# Patient Record
Sex: FEMALE | Race: BLACK OR AFRICAN AMERICAN | NOT HISPANIC OR LATINO | Employment: OTHER | URBAN - METROPOLITAN AREA
[De-identification: names, ages, dates, MRNs, and addresses within clinical notes are randomized per-mention and may not be internally consistent; named-entity substitution may affect disease eponyms.]

---

## 2017-01-12 ENCOUNTER — OFFICE VISIT (OUTPATIENT)
Dept: OBSTETRICS AND GYNECOLOGY | Facility: CLINIC | Age: 27
End: 2017-01-12
Payer: MEDICAID

## 2017-01-12 VITALS
BODY MASS INDEX: 25.57 KG/M2 | WEIGHT: 162.94 LBS | HEIGHT: 67 IN | DIASTOLIC BLOOD PRESSURE: 74 MMHG | SYSTOLIC BLOOD PRESSURE: 110 MMHG

## 2017-01-12 DIAGNOSIS — Z01.419 ENCOUNTER FOR GYNECOLOGICAL EXAMINATION WITHOUT ABNORMAL FINDING: Primary | ICD-10-CM

## 2017-01-12 DIAGNOSIS — L30.9 DERMATITIS: ICD-10-CM

## 2017-01-12 PROCEDURE — 99213 OFFICE O/P EST LOW 20 MIN: CPT | Mod: S$PBB,,, | Performed by: OBSTETRICS & GYNECOLOGY

## 2017-01-12 PROCEDURE — 87480 CANDIDA DNA DIR PROBE: CPT

## 2017-01-12 PROCEDURE — 99999 PR PBB SHADOW E&M-EST. PATIENT-LVL II: CPT | Mod: PBBFAC,,, | Performed by: OBSTETRICS & GYNECOLOGY

## 2017-01-12 PROCEDURE — 99212 OFFICE O/P EST SF 10 MIN: CPT | Mod: PBBFAC | Performed by: OBSTETRICS & GYNECOLOGY

## 2017-01-12 RX ORDER — ECONAZOLE NITRATE 10 MG/G
CREAM TOPICAL DAILY
Qty: 30 G | Refills: 1 | Status: SHIPPED | OUTPATIENT
Start: 2017-01-12

## 2017-01-12 NOTE — PROGRESS NOTES
Subjective:      Chief Complaint:    Chief Complaint   Patient presents with    Vaginal Discharge       Menstrual History:    OB History      Para Term  AB TAB SAB Ectopic Multiple Living    1 1 1       1          Menarche age: 15     Patient's last menstrual period was 2016.           Objective:        History of Present Illness AND  Examination detailed DICTATE:       PRESENT ILLNESS/PHYSICAL EXAMINATION NOTE:  The patient is 26 years of age.  New   patient to me, old patient to the clinic.  The patient had examination complete   yearly exam by nurse practitioner.  Pap smear is negative, found to be normal.    The patient presented to clinic because of irritation in the iliofemoral crease   and some discharge.  She is not on any birth control medication, uses condoms.    Menarche at age 15, regular cycles 7 days bleeding first 2-3 days heavy,   moderate pain, not requiring treatment.    REVIEW OF SYSTEMS:  Essentially negative.    PHYSICAL EXAMINATION:  VITAL SIGNS:  Blood pressure 110/74, weight, is 162.  GENERAL:  Well-nourished, well-developed female.  Examination essentially   normal.  PELVIC:  External is normal.  Bartholin, urethral and Summer Set as negative.  Vagina   is clear.  Mild discharge.  It looks physiologic.  No evidence of inflammation.    Cervix clear.  Uterus, normal size.  Adnexa negative.  Good pelvic support.    PLAN:  However, the patient had irritation in the iliofemoral crease.  It   appears to be a contact type of problem.  We will gave her some Spectazole   ointment to be used twice a day for the next two weeks.  If the problem   disappears, we will not have to see her until her regular exam due.  If the   vaginal culture indicates any abnormality, then we will treat it according to   the abnormality.      SHEYLA  dd: 2017 14:29:32 (CST)  td: 2017 12:16:27 (CST)  Doc ID   #[id number]  Job ID #[id number]    CC:           Assessment:      Diagnosis: DERMATITIS        Plan:      Return in 10  months

## 2017-01-12 NOTE — MR AVS SNAPSHOT
[facility] [place] - [facility]/ [facility]YN  [address], [address]  [place] [address]  Phone: [phone]                  Kala Giraldo   2017 2:30 PM   Office Visit    Description:  Female : 1990   Provider:  Dominguez Sun MD   Department:  [facility] [place] - [facility]/ [facility]YN           Reason for Visit     Vaginal Discharge           Diagnoses this Visit        Comments    Encounter for gynecological examination without abnormal finding    -  Primary     Dermatitis                To Do List           Goals (5 Years of Data)     None      Follow-Up and Disposition     Return in about 10 months (around 2017).       These Medications        Disp Refills Start End    econazole nitrate 1 % cream 30 g 1 2017     Apply topically once daily. - Topical (Top)    Pharmacy: [facility] [address] [place] [address] [address] AT [facility]  #: [phone]         [facility] On Call     [facility] On Call Nurse Care Line -  Assistance  Registered nurses in the [facility] On Call Center provide clinical advisement, health education, appointment booking, and other advisory services.  Call for this free service at [phone].             Medications           Message regarding Medications     Verify the changes and/or additions to your medication regime listed below are the same as discussed with your clinician today.  If any of these changes or additions are incorrect, please notify your healthcare provider.        START taking these NEW medications        Refills    econazole nitrate 1 % cream 1    Sig: Apply topically once daily.    Class: Normal    Route: Topical (Top)           Verify that the below list of medications is an accurate representation of the medications you are currently taking.  If none reported, the list may be blank. If incorrect, please contact your healthcare provider. Carry this list with you in case of emergency.           Current Medications     econazole  "nitrate 1 % cream Apply topically once daily.           Clinical Reference Information           Vital Signs - Last Recorded  Most recent update: 1/12/2017  2:19 PM by Sarai Castro MA    BP Ht Wt LMP BMI    110/74 5' 7" (1.702 m) 73.9 kg (162 lb 14.7 oz) 12/25/2016 25.52 kg/m2      Blood Pressure          Most Recent Value    BP  110/74      Allergies as of 1/12/2017     No Known Allergies      Immunizations Administered on Date of Encounter - 1/12/2017     None      Orders Placed During Today's Visit      Normal Orders This Visit    Vaginosis Screen by DNA Probe       "

## 2017-01-13 LAB
CANDIDA RRNA VAG QL PROBE: NEGATIVE
G VAGINALIS RRNA GENITAL QL PROBE: POSITIVE
T VAGINALIS RRNA GENITAL QL PROBE: NEGATIVE

## 2017-01-16 DIAGNOSIS — N76.1 CHRONIC VAGINITIS: Primary | ICD-10-CM

## 2017-01-16 RX ORDER — METRONIDAZOLE 500 MG/1
500 TABLET ORAL 3 TIMES DAILY
Qty: 30 TABLET | Refills: 1 | Status: SHIPPED | OUTPATIENT
Start: 2017-01-16 | End: 2017-01-26

## 2017-02-13 ENCOUNTER — PATIENT MESSAGE (OUTPATIENT)
Dept: OBSTETRICS AND GYNECOLOGY | Facility: CLINIC | Age: 27
End: 2017-02-13

## 2017-02-13 DIAGNOSIS — N76.0 ACUTE VAGINITIS: Primary | ICD-10-CM

## 2017-02-13 RX ORDER — METRONIDAZOLE 500 MG/1
500 TABLET ORAL 3 TIMES DAILY
Qty: 30 TABLET | Refills: 1 | Status: SHIPPED | OUTPATIENT
Start: 2017-02-13 | End: 2017-02-23

## 2017-03-31 ENCOUNTER — HOSPITAL ENCOUNTER (EMERGENCY)
Facility: OTHER | Age: 27
Discharge: HOME | End: 2017-03-31
Attending: EMERGENCY MEDICINE
Payer: COMMERCIAL

## 2017-03-31 VITALS
OXYGEN SATURATION: 98 % | RESPIRATION RATE: 18 BRPM | HEIGHT: 67 IN | SYSTOLIC BLOOD PRESSURE: 112 MMHG | TEMPERATURE: 98 F | DIASTOLIC BLOOD PRESSURE: 78 MMHG | WEIGHT: 160 LBS | HEART RATE: 85 BPM | BODY MASS INDEX: 25.11 KG/M2

## 2017-03-31 DIAGNOSIS — J10.1 INFLUENZA B: Primary | ICD-10-CM

## 2017-03-31 LAB
B-HCG UR QL: NEGATIVE
CTP QC/QA: YES
FLUAV AG SPEC QL IA: NEGATIVE
FLUBV AG SPEC QL IA: POSITIVE
SPECIMEN SOURCE: ABNORMAL

## 2017-03-31 PROCEDURE — 96372 THER/PROPH/DIAG INJ SC/IM: CPT

## 2017-03-31 PROCEDURE — 81025 URINE PREGNANCY TEST: CPT | Performed by: EMERGENCY MEDICINE

## 2017-03-31 PROCEDURE — 87400 INFLUENZA A/B EACH AG IA: CPT

## 2017-03-31 PROCEDURE — 99283 EMERGENCY DEPT VISIT LOW MDM: CPT | Mod: 25

## 2017-03-31 PROCEDURE — 63600175 PHARM REV CODE 636 W HCPCS: Performed by: PHYSICIAN ASSISTANT

## 2017-03-31 RX ORDER — IBUPROFEN 800 MG/1
800 TABLET ORAL EVERY 6 HOURS PRN
Qty: 20 TABLET | Refills: 0 | Status: SHIPPED | OUTPATIENT
Start: 2017-03-31 | End: 2024-02-01

## 2017-03-31 RX ORDER — KETOROLAC TROMETHAMINE 30 MG/ML
30 INJECTION, SOLUTION INTRAMUSCULAR; INTRAVENOUS
Status: COMPLETED | OUTPATIENT
Start: 2017-03-31 | End: 2017-03-31

## 2017-03-31 RX ORDER — GUAIFENESIN 600 MG/1
1200 TABLET, EXTENDED RELEASE ORAL 2 TIMES DAILY
Qty: 40 TABLET | Refills: 0 | Status: SHIPPED | OUTPATIENT
Start: 2017-03-31 | End: 2017-04-10

## 2017-03-31 RX ADMIN — KETOROLAC TROMETHAMINE 30 MG: 30 INJECTION, SOLUTION INTRAMUSCULAR at 01:03

## 2017-03-31 NOTE — ED TRIAGE NOTES
Pt reports to ED c/o generalized body aches, chills, nasal congestion x 2 days, denies fevers, N/V/D, pt did not receive flu shot. AAO x 3.

## 2017-03-31 NOTE — ED PROVIDER NOTES
"Encounter Date: 3/31/2017       History     Chief Complaint   Patient presents with    Generalized Body Aches     x several days. + productive cough w/ clear sputum, fatigue, diarrhea " My son was recently told he had the Flu last week".      Review of patient's allergies indicates:  No Known Allergies  HPI Comments: Patient is a 26 y.o. female presenting to the emergency department with complaints of generalized body aches, nasal congestion, rhinorrhea and cough.  The patient reports her symptoms have been persistent for the past 2 days.  She reports her cough is nonproductive in nature, denies shortness of breath.  She also reports she has had chills at home, denies fever denies taking her temperature.  She states she has tried Robitussin and Flonase with no relief of her symptoms.  She denies abdominal pain, nausea or vomiting.  She does report that her son was recently diagnosed with the flu last week.  She denies dysuria hematuria.  She has not taken any medication for symptoms today.    The history is provided by the patient.     History reviewed. No pertinent past medical history.  History reviewed. No pertinent surgical history.  Family History   Problem Relation Age of Onset    Miscarriages / Stillbirths Maternal Grandfather      Social History   Substance Use Topics    Smoking status: Never Smoker    Smokeless tobacco: Never Used    Alcohol use Yes     Review of Systems   Constitutional: Negative for activity change, appetite change, chills, fatigue and fever.   HENT: Positive for congestion, rhinorrhea and sore throat. Negative for sinus pressure, sneezing and trouble swallowing.    Eyes: Negative for photophobia and visual disturbance.   Respiratory: Negative for cough, chest tightness, shortness of breath and wheezing.    Cardiovascular: Negative for chest pain and palpitations.   Gastrointestinal: Negative for abdominal pain, constipation, diarrhea, nausea and vomiting.   Genitourinary: Negative " for dysuria, hematuria and urgency.   Musculoskeletal: Positive for myalgias. Negative for back pain, neck pain and neck stiffness.   Skin: Negative for color change and wound.   Neurological: Positive for headaches. Negative for dizziness, weakness, light-headedness and numbness.   Psychiatric/Behavioral: Negative for agitation and confusion.       Physical Exam   Initial Vitals   BP Pulse Resp Temp SpO2   03/31/17 1159 03/31/17 1159 03/31/17 1159 03/31/17 1159 03/31/17 1159   111/59 96 16 97.8 °F (36.6 °C) 100 %     Physical Exam    Nursing note and vitals reviewed.  Constitutional: She appears well-developed and well-nourished. She is not diaphoretic. She is cooperative.  Non-toxic appearance. She does not have a sickly appearance. She does not appear ill. No distress.   Well appearing, -American female accompanied by her mother in the emergency department.  She is speaking in clear and full sentences, moving all extremities.  She is in no acute distress.   HENT:   Head: Normocephalic and atraumatic.   Right Ear: Hearing, tympanic membrane, external ear and ear canal normal.   Left Ear: Hearing, tympanic membrane, external ear and ear canal normal.   Nose: Mucosal edema and rhinorrhea present.   Mouth/Throat: Uvula is midline, oropharynx is clear and moist and mucous membranes are normal.   Eyes: Conjunctivae and EOM are normal.   Neck: Normal range of motion. Neck supple.   Cardiovascular: Normal rate, regular rhythm and normal heart sounds.   Pulmonary/Chest: Breath sounds normal. No respiratory distress. She has no wheezes. She has no rhonchi. She has no rales.   Abdominal: Soft. Bowel sounds are normal. She exhibits no distension. There is no tenderness. There is no rebound and no guarding.   Musculoskeletal: Normal range of motion.   Neurological: She is alert and oriented to person, place, and time. GCS eye subscore is 4. GCS verbal subscore is 5. GCS motor subscore is 6.   Skin: Skin is warm.    Psychiatric: She has a normal mood and affect. Her behavior is normal. Judgment and thought content normal.         ED Course   Procedures  Labs Reviewed   INFLUENZA A AND B ANTIGEN - Abnormal; Notable for the following:        Result Value    Influenza B Ag, EIA Positive (*)     All other components within normal limits   POCT URINE PREGNANCY             Medical Decision Making:   Clinical Tests:   Lab Tests: Ordered and Reviewed  The following lab test(s) were unremarkable: UPT       <> Summary of Lab: Rapid influenza   Other:   I have discussed this case with another health care provider.       <> Summary of the Discussion: Dr. Biggs  This note was created using Dragon Medical Dictation. There may be typographical errors secondary to dictation.     Urgent evaluation of a 26 y.o. female presenting to the emergency department complaining of rhinorrhea, myalgias, chills. Patient is afebrile, nontoxic appearing and hemodynamically stable.  Physical exam reveals regular rate and rhythm, lungs are clear to auscultation bilaterally.  Boggy nasal mucosa noted.  Moist mucous membranes.  Will plan to obtain rapid influenza, UPT, administer Toradol and reassess.  Rapid influenza is positive for influenza B.  I did have a likely discussion with the patient in regards to Tamiflu.  She decided not to pursue this.  Instead she'll be given a prescription for ibuprofen and Mucinex, counseled on further symptomatic care and treatment.  She stable for discharge. The patient was instructed to follow up with a primary care provider in 2 days or to return to the emergency department for worsening symptoms. The treatment plan was discussed with the patient who demonstrated understanding and comfort with plan. The patient's history, physical exam, and plan of care was discussed with and agreed upon with my supervising physician.     History reviewed. No pertinent past medical history.                  ED Course     Clinical  Impression:     1. Influenza B       Disposition:   Disposition: Discharged  Condition: Stable       Mary Farmer PA-C  03/31/17 1328

## 2017-07-17 ENCOUNTER — HOSPITAL ENCOUNTER (EMERGENCY)
Facility: HOSPITAL | Age: 27
Discharge: HOME | End: 2017-07-17
Attending: EMERGENCY MEDICINE
Payer: COMMERCIAL

## 2017-07-17 VITALS
BODY MASS INDEX: 24.64 KG/M2 | HEART RATE: 62 BPM | WEIGHT: 157 LBS | OXYGEN SATURATION: 100 % | DIASTOLIC BLOOD PRESSURE: 68 MMHG | TEMPERATURE: 99 F | HEIGHT: 67 IN | SYSTOLIC BLOOD PRESSURE: 115 MMHG | RESPIRATION RATE: 18 BRPM

## 2017-07-17 DIAGNOSIS — J06.9 UPPER RESPIRATORY TRACT INFECTION, UNSPECIFIED TYPE: Primary | ICD-10-CM

## 2017-07-17 LAB
B-HCG UR QL: NEGATIVE
CTP QC/QA: YES

## 2017-07-17 PROCEDURE — 99283 EMERGENCY DEPT VISIT LOW MDM: CPT | Mod: 25

## 2017-07-17 PROCEDURE — 25000003 PHARM REV CODE 250: Performed by: EMERGENCY MEDICINE

## 2017-07-17 PROCEDURE — 81025 URINE PREGNANCY TEST: CPT | Performed by: EMERGENCY MEDICINE

## 2017-07-17 PROCEDURE — 99283 EMERGENCY DEPT VISIT LOW MDM: CPT | Mod: ,,, | Performed by: EMERGENCY MEDICINE

## 2017-07-17 RX ORDER — IBUPROFEN 600 MG/1
600 TABLET ORAL
Status: COMPLETED | OUTPATIENT
Start: 2017-07-17 | End: 2017-07-17

## 2017-07-17 RX ADMIN — IBUPROFEN 600 MG: 600 TABLET, FILM COATED ORAL at 08:07

## 2017-07-18 NOTE — ED PROVIDER NOTES
Encounter Date: 7/17/2017    SCRIBE #1 NOTE: I, Thiago Mckeon, am scribing for, and in the presence of,  Dr. Mccabe. I have scribed the entire note.       History     Chief Complaint   Patient presents with    Nasal Congestion     sneezing, fatigue for 2 d     Time patient was seen by the provider: 7:45 PM      The patient is a 27 y.o. female that presents to the ED with a complaint of bitemporal headache and chest tightness, which began today. Headache reported at a 5/10 severity currently. She notes an associated sneezing and ear pain. Pt endorses taking tylenol at 12 PM without symptom relief.  Pt denies any cough or fever. No history of asthma.       The history is provided by the patient.     Review of patient's allergies indicates:  No Known Allergies  History reviewed. No pertinent past medical history.  History reviewed. No pertinent surgical history.  Family History   Problem Relation Age of Onset    Miscarriages / Stillbirths Maternal Grandfather      Social History   Substance Use Topics    Smoking status: Never Smoker    Smokeless tobacco: Never Used    Alcohol use Yes     Review of Systems   Constitutional: Negative for fever.   HENT: Positive for ear pain. Negative for sore throat.    Respiratory: Positive for chest tightness. Negative for cough and shortness of breath.    Cardiovascular: Negative for chest pain.   Gastrointestinal: Negative for nausea.   Genitourinary: Negative for dysuria.   Musculoskeletal: Negative for back pain.   Skin: Negative for rash.   Neurological: Positive for headaches. Negative for weakness.   Hematological: Does not bruise/bleed easily.       Physical Exam     Initial Vitals [07/17/17 1836]   BP Pulse Resp Temp SpO2   118/61 74 18 98.6 °F (37 °C) 99 %      MAP       80         Physical Exam    Nursing note and vitals reviewed.  Constitutional: She appears well-developed and well-nourished. She is not diaphoretic. No distress.   HENT:   Ears- bilateral TM are normal.  No sinus tenderness    Neck: Normal range of motion. Neck supple.   Cardiovascular: Normal rate, regular rhythm and normal heart sounds.   No murmur heard.  Pulmonary/Chest: Breath sounds normal. No respiratory distress. She has no wheezes.   Musculoskeletal: Normal range of motion.   Neurological: She is alert and oriented to person, place, and time. She has normal strength. No cranial nerve deficit or sensory deficit.   Normal gait   Skin: Skin is warm and dry.         ED Course   Procedures  Labs Reviewed   POCT URINE PREGNANCY - Normal             Medical Decision Making:   History:   Old Medical Records: I decided to obtain old medical records.  Old Records Summarized: other records.       <> Summary of Records: Pt with no PMHx. Previously seen in the ED for the flu  Initial Assessment:   Pt with 1 day of sneezing, chest tightness, right ear pain and vital signs stable. Exam is normal. History and physical exam are consistent with viral URI. I have advised pt to continue with over the counter decongestants and NSAIDs and to follow up with PCP. Return instructions given.   Clinical Tests:   Lab Tests: Ordered and Reviewed            Scribe Attestation:   Scribe #1: I performed the above scribed service and the documentation accurately describes the services I performed. I attest to the accuracy of the note.    Attending Attestation:           Physician Attestation for Scribe:  Physician Attestation Statement for Scribe #1: I, Dr. Mccabe, reviewed documentation, as scribed by Thiago Mckeon in my presence, and it is both accurate and complete.                 ED Course     Clinical Impression:   The encounter diagnosis was Upper respiratory tract infection, unspecified type.    Disposition:   Disposition: Discharged  Condition: Stable                        Jordyn Mccabe MD  07/19/17 1236

## 2017-07-18 NOTE — ED TRIAGE NOTES
Pt reports chest congestion, earache, headache that started today. Reports taking tylenol sinus and it did not help. Denies N/V. Denies SOB.

## 2017-07-18 NOTE — ED NOTES
Pt karyn Giraldo checked and correct  LOC: The patient is awake, alert, aware of environment with an appropriate affect. Oriented x3, speaking appropriately  APPEARANCE: Pt resting comfortably, in no acute distress, pt is clean and well groomed, clothing properly fastened  SKIN: Skin warm, dry and intact, normal skin turgor, moist mucus membranes  RESPIRATORY: Airway is open and patent, respirations are spontaneous, even and unlabored, normal effort and rate  CARDIAC: Normal rate and rhythm, no peripheral edema noted, capillary refill < 3 seconds, bilateral radial pulses 2+

## 2017-08-07 ENCOUNTER — HOSPITAL ENCOUNTER (EMERGENCY)
Facility: HOSPITAL | Age: 27
Discharge: HOME | End: 2017-08-07
Attending: EMERGENCY MEDICINE
Payer: COMMERCIAL

## 2017-08-07 VITALS
OXYGEN SATURATION: 99 % | HEIGHT: 67 IN | HEART RATE: 78 BPM | SYSTOLIC BLOOD PRESSURE: 127 MMHG | DIASTOLIC BLOOD PRESSURE: 60 MMHG | TEMPERATURE: 99 F | RESPIRATION RATE: 18 BRPM | BODY MASS INDEX: 24.8 KG/M2 | WEIGHT: 158 LBS

## 2017-08-07 DIAGNOSIS — J06.9 VIRAL URI: Primary | ICD-10-CM

## 2017-08-07 PROCEDURE — 99282 EMERGENCY DEPT VISIT SF MDM: CPT | Mod: ,,, | Performed by: EMERGENCY MEDICINE

## 2017-08-07 PROCEDURE — 99282 EMERGENCY DEPT VISIT SF MDM: CPT

## 2017-08-08 NOTE — ED TRIAGE NOTES
Fatigue, nasal congestion, throat hoarseness, and chest heaviness x 2 days. Also c/o diarrhea. Denies taking temperature at home.

## 2017-08-08 NOTE — ED PROVIDER NOTES
Encounter Date: 8/7/2017    SCRIBE #1 NOTE: I, Hunter Choe, am scribing for, and in the presence of,  Mone Chavis PA-C. I have scribed the following portions of the note - Other sections scribed: HPI, ROS, PE.       History     Chief Complaint   Patient presents with    URI     sore throat, hoarse, stuffy nose, felt like fever over weekend, fatigue     Time patient was seen by the provider: 9:38 PM      The patient is a 27 y.o. female with no significant past medical history that presents to the ED with a complaint of an URI that began 2 days ago. Pt states that 2 days ago she had a subjective fever (now resolved), fatigue, nasal congestion, and sneezing. She mentions that she took Nyquil that day and rested, temporarily relieving her sx. However, this morning, she complains of a sore throat, nonproductive coughing, and pain when swallowing. She also complains of some chest tightness, which is not new. Pt denies nausea, emesis, and any current fevers. Pt adds that she went to work today and was told by work that she should come into the ED. She admits to taking Flonase and mentions that she does not currently have a PCP.         The history is provided by the patient and medical records.     Review of patient's allergies indicates:  No Known Allergies  History reviewed. No pertinent past medical history.  History reviewed. No pertinent surgical history.  Family History   Problem Relation Age of Onset    Miscarriages / Stillbirths Maternal Grandfather      Social History   Substance Use Topics    Smoking status: Never Smoker    Smokeless tobacco: Never Used    Alcohol use Yes     Review of Systems   Constitutional: Positive for fatigue and fever (now resolved).   HENT: Positive for sneezing, sore throat and trouble swallowing (with pain).         + nasal congestion   Respiratory: Positive for cough (nonproductive) and chest tightness (not new). Negative for shortness of breath.    Cardiovascular: Negative  for chest pain.   Gastrointestinal: Negative for nausea and vomiting.   Genitourinary: Negative for dysuria.   Musculoskeletal: Negative for back pain.   Skin: Negative for rash.   Neurological: Negative for weakness.   Hematological: Does not bruise/bleed easily.       Physical Exam     Initial Vitals [08/07/17 1829]   BP Pulse Resp Temp SpO2   (!) 128/58 81 16 98.4 °F (36.9 °C) 100 %      MAP       81.33         Physical Exam    Nursing note and vitals reviewed.  Constitutional: She appears well-developed and well-nourished. She is not diaphoretic. No distress.   HENT:   Head: Normocephalic and atraumatic.   Mouth/Throat: Oropharynx is clear and moist. No oropharyngeal exudate.   No exudates.  No erythema.    Eyes: Conjunctivae and EOM are normal.   Neck: Normal range of motion. Neck supple.   Cardiovascular: Normal rate and regular rhythm.   Pulmonary/Chest: Breath sounds normal. No respiratory distress.   Lungs are clear to auscultation.    Abdominal: Soft. She exhibits no distension. There is no tenderness. There is no guarding.   Musculoskeletal: Normal range of motion. She exhibits no edema.   Neurological: She is alert and oriented to person, place, and time. No cranial nerve deficit.   Skin: Skin is warm and dry. No rash noted.         ED Course   Procedures  Labs Reviewed - No data to display          Medical Decision Making:   History:   Old Medical Records: I decided to obtain old medical records.       APC / Resident Notes:   The patient is a 27 y.o. female with no significant past medical history that presents to the ED with a complaint of an URI that began 2 days ago.  Physical exam reveals female in no acute distress.  Heart regular rate and rhythm.  Lungs clear to auscultation bilaterally.  Abdomen soft nontender nondistended.  Throat without erythema or exudates.  Patient vital signs are stable and patient is afebrile.  Suspect patient's symptoms are due to viral etiology.  Patient will be  discharged in stable condition and is to follow-up with PCP in near future.  Patient encouraged to take over-the-counter decongestants and NSAIDs.  Plan of treatment discussed with attending physician and he is agreeable.       Scribe Attestation:   Scribe #1: I performed the above scribed service and the documentation accurately describes the services I performed. I attest to the accuracy of the note.    Attending Attestation:     Physician Attestation Statement for NP/PA:   I discussed this assessment and plan of this patient with the NP/PA, but I did not personally examine the patient. The face to face encounter was performed by the NP/PA.        Physician Attestation for Scribe:  Physician Attestation Statement for Scribe #1: I, Mone Chavis PA-C, reviewed documentation, as scribed by Hunter Choe in my presence, and it is both accurate and complete.                 ED Course     Clinical Impression:   The encounter diagnosis was Viral URI.    Disposition:   Disposition: Discharged  Condition: Stable                        Mone Chavis PA-C  08/08/17 0204       Hunter Ochoa MD  08/08/17 0317

## 2018-05-12 ENCOUNTER — HOSPITAL ENCOUNTER (EMERGENCY)
Facility: HOSPITAL | Age: 28
Discharge: HOME | End: 2018-05-12
Attending: EMERGENCY MEDICINE
Payer: COMMERCIAL

## 2018-05-12 VITALS
WEIGHT: 169 LBS | SYSTOLIC BLOOD PRESSURE: 116 MMHG | HEART RATE: 91 BPM | TEMPERATURE: 99 F | OXYGEN SATURATION: 99 % | DIASTOLIC BLOOD PRESSURE: 58 MMHG | BODY MASS INDEX: 26.53 KG/M2 | HEIGHT: 67 IN | RESPIRATION RATE: 18 BRPM

## 2018-05-12 DIAGNOSIS — B34.9 VIRAL SYNDROME: Primary | ICD-10-CM

## 2018-05-12 DIAGNOSIS — R68.83 CHILLS: ICD-10-CM

## 2018-05-12 LAB
ANION GAP SERPL CALC-SCNC: 9 MMOL/L
B-HCG UR QL: NEGATIVE
BASOPHILS # BLD AUTO: 0.01 K/UL
BASOPHILS NFR BLD: 0.1 %
BILIRUB UR QL STRIP: NEGATIVE
BUN SERPL-MCNC: 8 MG/DL
CALCIUM SERPL-MCNC: 9 MG/DL
CHLORIDE SERPL-SCNC: 107 MMOL/L
CLARITY UR REFRACT.AUTO: ABNORMAL
CO2 SERPL-SCNC: 22 MMOL/L
COLOR UR AUTO: YELLOW
CREAT SERPL-MCNC: 0.8 MG/DL
CTP QC/QA: YES
DIFFERENTIAL METHOD: ABNORMAL
EOSINOPHIL # BLD AUTO: 0.1 K/UL
EOSINOPHIL NFR BLD: 1.5 %
ERYTHROCYTE [DISTWIDTH] IN BLOOD BY AUTOMATED COUNT: 14.1 %
EST. GFR  (AFRICAN AMERICAN): >60 ML/MIN/1.73 M^2
EST. GFR  (NON AFRICAN AMERICAN): >60 ML/MIN/1.73 M^2
GLUCOSE SERPL-MCNC: 94 MG/DL
GLUCOSE UR QL STRIP: NEGATIVE
HCT VFR BLD AUTO: 33.7 %
HGB BLD-MCNC: 10.7 G/DL
HGB UR QL STRIP: NEGATIVE
IMM GRANULOCYTES # BLD AUTO: 0.01 K/UL
IMM GRANULOCYTES NFR BLD AUTO: 0.1 %
KETONES UR QL STRIP: NEGATIVE
LEUKOCYTE ESTERASE UR QL STRIP: NEGATIVE
LYMPHOCYTES # BLD AUTO: 0.6 K/UL
LYMPHOCYTES NFR BLD: 8.4 %
MCH RBC QN AUTO: 25.8 PG
MCHC RBC AUTO-ENTMCNC: 31.8 G/DL
MCV RBC AUTO: 81 FL
MONOCYTES # BLD AUTO: 0.6 K/UL
MONOCYTES NFR BLD: 8.5 %
NEUTROPHILS # BLD AUTO: 6.1 K/UL
NEUTROPHILS NFR BLD: 81.4 %
NITRITE UR QL STRIP: NEGATIVE
NRBC BLD-RTO: 0 /100 WBC
PH UR STRIP: 5 [PH] (ref 5–8)
PLATELET # BLD AUTO: 168 K/UL
PMV BLD AUTO: 11.6 FL
POTASSIUM SERPL-SCNC: 3.6 MMOL/L
PROT UR QL STRIP: NEGATIVE
RBC # BLD AUTO: 4.14 M/UL
SODIUM SERPL-SCNC: 138 MMOL/L
SP GR UR STRIP: 1.03 (ref 1–1.03)
URN SPEC COLLECT METH UR: ABNORMAL
UROBILINOGEN UR STRIP-ACNC: NEGATIVE EU/DL
WBC # BLD AUTO: 7.49 K/UL

## 2018-05-12 PROCEDURE — 80048 BASIC METABOLIC PNL TOTAL CA: CPT

## 2018-05-12 PROCEDURE — 81025 URINE PREGNANCY TEST: CPT | Performed by: EMERGENCY MEDICINE

## 2018-05-12 PROCEDURE — 99283 EMERGENCY DEPT VISIT LOW MDM: CPT | Mod: ,,, | Performed by: EMERGENCY MEDICINE

## 2018-05-12 PROCEDURE — 99284 EMERGENCY DEPT VISIT MOD MDM: CPT | Mod: 25

## 2018-05-12 PROCEDURE — 81003 URINALYSIS AUTO W/O SCOPE: CPT

## 2018-05-12 PROCEDURE — 25000003 PHARM REV CODE 250: Performed by: EMERGENCY MEDICINE

## 2018-05-12 PROCEDURE — 85025 COMPLETE CBC W/AUTO DIFF WBC: CPT

## 2018-05-12 RX ORDER — ACETAMINOPHEN 500 MG
500 TABLET ORAL
Status: COMPLETED | OUTPATIENT
Start: 2018-05-12 | End: 2018-05-12

## 2018-05-12 RX ADMIN — ACETAMINOPHEN 500 MG: 500 TABLET, FILM COATED ORAL at 07:05

## 2018-05-13 NOTE — ED NOTES
Pt identifiers checked and accurate with Kala Giraldo    Pt reports to ED with complaints of headache and chills beginning today. Pt reports body aches and fatigue throughout the day. Pt denies fever, N/V/D, vision changes and dizziness.     LOC: The patient is awake, alert and aware of environment with an appropriate affect, the patient is oriented x 3 and speaking appropriately.  APPEARANCE: Patient resting comfortably and in no acute distress, patient is clean and well groomed  SKIN: The skin is warm and dry, color consistent with ethnicity, patient has normal skin turgor and moist mucus membranes  MUSCULOSKELETAL: Patient moving all extremities well, no obvious swelling or deformities noted. Pt ambulated to exam room unassisted   RESPIRATORY: Airway is open and patent, breath sounds clear throughout all lung fields; respirations even and unlabored.   ABDOMEN: Soft and non tender to palpation, no distention noted. Bowel sounds present x 4  NEUROLOGIC: PERRL, 3 mm bilaterally, eyes open spontaneously, behavior appropriate to situation, follows commands

## 2018-05-13 NOTE — ED NOTES
Pt informed that a urine sample is needed for urinalysis and POCT urine pregnancy. Pt verbalized understanding.

## 2018-05-13 NOTE — ED PROVIDER NOTES
SCRIBE #1 NOTE: I, Cassi Arndt, am scribing for, and in the presence of,  Dr. Joiner. I have scribed the entire note.       CC: Chills (Pt reports chills & body aches since this AM - generalized fatigue.)      HPI: Kala Giraldo is a 27 y.o. year old female who presents to the ED complaining of fatigue, fever, chills, and HA.   Pt states HA is exacerbated with light.  Also notes rhinorrhea, congestion, myalgias, and sore throat.  Reports that her son had a fever last week as well as a cough.  Denies cough, CP, SOB, or problems with urination.  Also notes constipation, inability to pass gas, and back pain.  Denies weight gain or night diaphoresis.  States she had a fever at home of 100.3.  Pt also notes a rash to her right medial thigh and vaginal discharge no dyspareunia  Denies any chance of STD.        History reviewed. No pertinent past medical history.  History reviewed. No pertinent surgical history.  Family History   Problem Relation Age of Onset    Miscarriages / Stillbirths Maternal Grandfather      No current facility-administered medications on file prior to encounter.      Current Outpatient Prescriptions on File Prior to Encounter   Medication Sig Dispense Refill    econazole nitrate 1 % cream Apply topically once daily. 30 g 1    ibuprofen (ADVIL,MOTRIN) 800 MG tablet Take 1 tablet (800 mg total) by mouth every 6 (six) hours as needed for Pain. 20 tablet 0     Patient has no known allergies.  Social History     Social History    Marital status: Single     Spouse name: N/A    Number of children: N/A    Years of education: N/A     Social History Main Topics    Smoking status: Never Smoker    Smokeless tobacco: Never Used    Alcohol use Yes    Drug use: No    Sexual activity: Yes     Partners: Male     Birth control/ protection: Condom     Other Topics Concern    None     Social History Narrative    None       ROS:     Constitutional : pos for fever, chills, and fatigue  HEENT pos for  rhinorrhea, congestion, and sore throat, pos for photophobia  Resp neg  Cardiac  neg  GI neg   pos for vaginal discharge  Neuro pos for headache  Heme/Immune: neg  Endo neg  Skin pos for rash to medial thigh  Msk pos for myalgias    PHYSICAL EXAM:  Vitals:    05/12/18 1900   BP: (!) 126/58   Pulse: (!) 111   Resp: 16   Temp: 100.3 °F (37.9 °C)         PHYSICAL EXAM:   general: comfortable, in no acute distress  VS: triage VS reviewed  HEENT: NC/AT, PERRL, EOMI, erythema noted to throat  Neck: trachea midline, full ROM w/ no pain/stiffness  CV: RRR, no m/r/g, no LE pitting edema  Resp: CTAB  ABD:  ND, + normal BS, NT  Renal: No CVAT  Neuro: AAO x 3, 5/5 muscle strength in upper and lower extremities, sensation grossly intact, face symmetric, speech normal  Ext: no deformity, +2 symmetrical peripheral pulses. Neg brudzinski  Skin: no rash noted          DATA & INTERVENTIONS:    LABS reviewed:  Labs Reviewed   CBC W/ AUTO DIFFERENTIAL - Abnormal; Notable for the following:        Result Value    Hemoglobin 10.7 (*)     Hematocrit 33.7 (*)     MCV 81 (*)     MCH 25.8 (*)     MCHC 31.8 (*)     Lymph # 0.6 (*)     Gran% 81.4 (*)     Lymph% 8.4 (*)     All other components within normal limits   BASIC METABOLIC PANEL - Abnormal; Notable for the following:     CO2 22 (*)     All other components within normal limits   URINALYSIS, REFLEX TO URINE CULTURE - Abnormal; Notable for the following:     Appearance, UA Hazy (*)     All other components within normal limits    Narrative:     Preferred Collection Type->Urine, Clean Catch   POCT URINE PREGNANCY       RADIOLOGY reviewed:  Imaging Results          X-Ray Chest PA And Lateral (Final result)  Result time 05/12/18 20:18:13    Final result by Wilmer Henderson MD (05/12/18 20:18:13)                 Impression:      No acute abnormality.    Electronically signed by resident: Shane Almanza  Date:    05/12/2018  Time:    20:03    Electronically signed by: Wilmer Henderson  MD  Date:    05/12/2018  Time:    20:18             Narrative:    EXAMINATION:  XR CHEST PA AND LATERAL    CLINICAL HISTORY:  Chills (without fever)    TECHNIQUE:  PA and lateral views of the chest were performed.    COMPARISON:  None    FINDINGS:  The lungs are clear, with normal appearance of pulmonary vasculature and no pleural effusion or pneumothorax.  Apparent appearance of increased attenuation in the lower lung field bilaterally likely relates to overlying soft tissues and does not correlate with the lateral view.    The cardiac silhouette is normal in size. The hilar and mediastinal contours are unremarkable.    Bones are intact. The colon is in the left abdomen likely a normal variant, and is without distension.                                MEDICATIONS/FLUIDS:  Medications   acetaminophen tablet 500 mg (500 mg Oral Given 5/12/18 1933)         MDM: 27 y.o. female with symptoms that suggest viral infection.  Temperature 100.3 in the ED and tachycardia at 111.  Will give Tylenol.  Will check UA, CXR, and basic labs. HA unlikely meningitis, no clear symptoms of pneumonia. Vag discharge that she reports normal for her w/ no pelvic pain and no risk factors for STI. Right thigh no clear rash. Normal progression of URI symptoms discussed with the patient. Rpt vital after tylenol improved    8:31 PM CXR no infiltrate.  White count normal.  BMP no gross abnormalities.  Hemoglobin at 10.7.  No prior to compare with.  UA with no leukocytes, no nitrites, no blood.  Most likely viral syndrome.  Will discharge home with return precautions. (labs results including the anemia discussed w/ the patient, advised to f/u w/ PCP)    The patient has been carefully educated about symptoms and conditions that should prompt immediate return to the ED for recheck or further evaluation. Told to return immediately for any new or worsening or progressive symptoms. In addition, patient told to return to the ED if they are unable to  obtain adequate o/p follow-up as they have been directed. Patient expressed good undertanding of these instructions.          IMPRESSION:  1.) Viral syndrome    Dispo: Discharge    Critical Care Time: N/A       Geeta Joiner MD  05/14/18 [id number]

## 2025-07-04 NOTE — ED AVS SNAPSHOT
[facility]  [address] [address]               Kala Giraldo   3/31/2017 12:24 PM   ED    Description:  Female : 1990   Department:  [facility]           Your Care was Coordinated By:     Provider Role From To    Conchita Biggs MD Attending Provider [phone] --    Mary Farmer PA-C Physician Assistant [phone] --      Reason for Visit     Generalized Body Aches           Diagnoses this Visit        Comments    Influenza B    -  Primary       ED Disposition     None           To Do List           Follow-up Information     Follow up with [facility] In 2 days.    Contact information:     [employer] [facility] [address] [phone]          Follow up with [facility].    Specialty:  Emergency Medicine    Why:  If symptoms worsen    Contact information:    [address] [facility] [id number] [phone]       These Medications        Disp Refills Start End    ibuprofen (ADVIL,MOTRIN) 800 MG tablet 20 tablet 0 3/31/2017     Take 1 tablet (800 mg total) by mouth every 6 (six) hours as needed for Pain. - Oral    Pharmacy: [facility] [address] [place] [address] Ph #: [phone]       guaifenesin (MUCINEX) 600 mg 12 hr tablet 40 tablet 0 3/31/2017 4/10/2017    Take 2 tablets (1,200 mg total) by mouth 2 (two) times daily. - Oral    Pharmacy: [facility] [address] [place] [address] Ph #: [phone]         [facility]     [facility] Care Line -  Assistance  Unless otherwise directed by your provider, please contact [facility], our nurse care line that is available for  assistance.     Registered nurses in the [facility] provide: appointment scheduling, clinical advisement, health education, and other advisory services.  Call: [phone] (toll free)              "  Medications           Message regarding Medications     Verify the changes and/or additions to your medication regime listed below are the same as discussed with your clinician today.  If any of these changes or additions are incorrect, please notify your healthcare provider.        START taking these NEW medications        Refills    ibuprofen (ADVIL,MOTRIN) 800 MG tablet 0    Sig: Take 1 tablet (800 mg total) by mouth every 6 (six) hours as needed for Pain.    Class: Print    Route: Oral    guaifenesin (MUCINEX) 600 mg 12 hr tablet 0    Sig: Take 2 tablets (1,200 mg total) by mouth 2 (two) times daily.    Class: Print    Route: Oral      These medications were administered today        Dose Freq    ketorolac injection 30 mg 30 mg ED 1 Time    Sig: Inject 30 mg into the muscle ED 1 Time.    Class: Normal    Route: Intramuscular           Verify that the below list of medications is an accurate representation of the medications you are currently taking.  If none reported, the list may be blank. If incorrect, please contact your healthcare provider. Carry this list with you in case of emergency.           Current Medications     econazole nitrate 1 % cream Apply topically once daily.    guaifenesin (MUCINEX) 600 mg 12 hr tablet Take 2 tablets (1,200 mg total) by mouth 2 (two) times daily.    ibuprofen (ADVIL,MOTRIN) 800 MG tablet Take 1 tablet (800 mg total) by mouth every 6 (six) hours as needed for Pain.    ketorolac injection 30 mg Inject 30 mg into the muscle ED 1 Time.           Clinical Reference Information           Your Vitals Were     BP Pulse Temp Resp Height Weight    111/59 (BP Location: Left arm, Patient Position: Sitting) 96 97.8 °F (36.6 °C) (Oral) 16 5' 7" (1.702 m) 72.6 kg (160 lb)    Last Period SpO2 BMI          03/22/2017 100% 25.06 kg/m2        Allergies as of 3/31/2017     No Known Allergies      Immunizations Administered on Date of Encounter - 3/31/2017     None      ED Micro, Lab, POCT     " Start Ordered       Status Ordering Provider    03/31/17 1202 03/31/17 1202  Influenza antigen Nasopharyngeal Swab  Once      Final result     03/31/17 1202 03/31/17 1202  POCT urine pregnancy  Once      Final result       ED Imaging Orders     None      Discharge References/Attachments     INFLUENZA (ADULT) (ENGLISH)    INFLUENZA  (ENGLISH)    COLDS, ADULT SELF-CARE FOR (ENGLISH)       [facility] complies with applicable Federal civil rights laws and does not discriminate on the basis of race, color, national origin, age, disability, or sex.        Language Assistance Services     ATTENTION: Language assistance services are available, free of charge. Please call [phone].      ATENCIÓN: Si habla español, tiene a jeffers disposición servicios gratuitos de asistencia lingüística. Llame al [phone].     CHÚ Ý: N?u b?n nói Ti?ng Vi?t, có các d?ch v? h? tr? ngôn ng? mi?n phí dành cho b?n. G?i s? [phone].         Attending Contribution to Care: PEM ATTENDING ADDENDUM   I personally performed a history and physical examination, and discussed the management with the trainee.  The past medical and surgical history, review of systems, family history, social history, current medications, allergies, and immunization status were discussed with the trainee and I confirmed pertinent portions with the patient and/or family. I reviewed the assessment and plan documented by the trainee. I made modifications to the documentation above as I felt appropriate, and concur with what is documented above unless otherwise noted below.  I personally reviewed the diagnostic studies obtained    See attending MDM above